# Patient Record
Sex: MALE | Race: BLACK OR AFRICAN AMERICAN | Employment: OTHER | URBAN - METROPOLITAN AREA
[De-identification: names, ages, dates, MRNs, and addresses within clinical notes are randomized per-mention and may not be internally consistent; named-entity substitution may affect disease eponyms.]

---

## 2021-07-25 ENCOUNTER — HOSPITAL ENCOUNTER (EMERGENCY)
Age: 61
Discharge: HOME | End: 2021-07-25
Attending: EMERGENCY MEDICINE
Payer: OTHER GOVERNMENT

## 2021-07-25 ENCOUNTER — APPOINTMENT (OUTPATIENT)
Dept: GENERAL RADIOLOGY | Age: 61
End: 2021-07-25
Payer: OTHER GOVERNMENT

## 2021-07-25 VITALS
SYSTOLIC BLOOD PRESSURE: 107 MMHG | HEIGHT: 71 IN | BODY MASS INDEX: 27.3 KG/M2 | RESPIRATION RATE: 16 BRPM | DIASTOLIC BLOOD PRESSURE: 73 MMHG | HEART RATE: 87 BPM | OXYGEN SATURATION: 96 % | TEMPERATURE: 98.9 F | WEIGHT: 195 LBS

## 2021-07-25 DIAGNOSIS — R11.0 NAUSEA WITHOUT VOMITING: ICD-10-CM

## 2021-07-25 DIAGNOSIS — M79.10 MYALGIA: ICD-10-CM

## 2021-07-25 DIAGNOSIS — R05.9 COUGH: ICD-10-CM

## 2021-07-25 DIAGNOSIS — R19.7 DIARRHEA, UNSPECIFIED TYPE: ICD-10-CM

## 2021-07-25 DIAGNOSIS — T50.Z95A ADVERSE EFFECT OF VACCINE, INITIAL ENCOUNTER: Primary | ICD-10-CM

## 2021-07-25 PROCEDURE — 71045 X-RAY EXAM CHEST 1 VIEW: CPT

## 2021-07-25 PROCEDURE — 99284 EMERGENCY DEPT VISIT MOD MDM: CPT

## 2021-07-25 PROCEDURE — 6370000000 HC RX 637 (ALT 250 FOR IP): Performed by: EMERGENCY MEDICINE

## 2021-07-25 RX ORDER — BENZONATATE 100 MG/1
100 CAPSULE ORAL 2 TIMES DAILY PRN
Qty: 14 CAPSULE | Refills: 0 | Status: SHIPPED | OUTPATIENT
Start: 2021-07-25 | End: 2021-08-01

## 2021-07-25 RX ORDER — LOPERAMIDE HYDROCHLORIDE 2 MG/1
2 CAPSULE ORAL 4 TIMES DAILY PRN
Qty: 20 CAPSULE | Refills: 0 | Status: SHIPPED | OUTPATIENT
Start: 2021-07-25 | End: 2021-08-04

## 2021-07-25 RX ORDER — METHOCARBAMOL 500 MG/1
500 TABLET, FILM COATED ORAL 3 TIMES DAILY PRN
Qty: 21 TABLET | Refills: 0 | Status: SHIPPED | OUTPATIENT
Start: 2021-07-25 | End: 2021-08-01

## 2021-07-25 RX ORDER — LOPERAMIDE HYDROCHLORIDE 2 MG/1
2 CAPSULE ORAL ONCE
Status: COMPLETED | OUTPATIENT
Start: 2021-07-25 | End: 2021-07-25

## 2021-07-25 RX ORDER — ONDANSETRON 4 MG/1
4 TABLET, FILM COATED ORAL 3 TIMES DAILY PRN
Qty: 15 TABLET | Refills: 0 | Status: SHIPPED | OUTPATIENT
Start: 2021-07-25

## 2021-07-25 RX ORDER — METHOCARBAMOL 500 MG/1
1000 TABLET, FILM COATED ORAL ONCE
Status: COMPLETED | OUTPATIENT
Start: 2021-07-25 | End: 2021-07-25

## 2021-07-25 RX ADMIN — LOPERAMIDE HYDROCHLORIDE 2 MG: 2 CAPSULE ORAL at 21:10

## 2021-07-25 RX ADMIN — METHOCARBAMOL 1000 MG: 500 TABLET ORAL at 21:10

## 2021-07-25 NOTE — ED TRIAGE NOTES
Patient presents to the ER with c/o of nausea, cough, and generalized illness. Patient states he received his 2nd COVID shot on the 21st and has not felt good since then.

## 2021-07-26 NOTE — ED PROVIDER NOTES
CHIEF COMPLAINT    Chief Complaint   Patient presents with    Nausea     states got his 2nd COVID shot 4 days ago; having side effects    Cough     HPI  Keena Bueno is a 63 y.o. male who presents to the ED with complaints of myalgias, cough, diarrhea and nausea over the last 4 days. Patient states that he received his second COVID-19 vaccination 4 days ago and since that time has been feeling generally unwell. He has been experiencing fatigue with diffuse body myalgias and cough. The cough is intermittently productive of clear sputum. In addition he has experienced some nausea and today began to experience persistent diarrhea. Nothing seems to make symptoms better or worse. Patient states he symptoms take DayQuil once yesterday without relief. He did take Motrin approximately 2 hours prior to arrival as well. Denies measured fevers, chest pain, shortness of breath, dizziness, lightheadedness. REVIEW OF SYSTEMS  Constitutional: Complains of chills without measured fevers  Eye: No visual changes  HENT: No earache or sore throat. Resp: Complains of cough. No shortness of breath  Cardio: No chest pain or palpitations. GI: No abdominal pain, nausea, vomiting, constipation or diarrhea. No melena. : No dysuria, urgency or frequency. Endocrine: No heat intolerance, no cold intolerance, no polydipsia   Lymphatics: No adenopathy  Musculoskeletal: Complains of myalgias  Neuro: No headaches. Psych: No homicidal or suicidal thoughts  Skin: No rash, No itching. ?  ? PAST MEDICAL HISTORY  History reviewed. No pertinent past medical history. FAMILY HISTORY  History reviewed. No pertinent family history.   SOCIAL HISTORY  Social History     Socioeconomic History    Marital status: Single     Spouse name: None    Number of children: None    Years of education: None    Highest education level: None   Occupational History    None   Tobacco Use    Smoking status: Never Smoker   Substance and Sexual Activity    Alcohol use: Never    Drug use: Never    Sexual activity: None   Other Topics Concern    None   Social History Narrative    None     Social Determinants of Health     Financial Resource Strain:     Difficulty of Paying Living Expenses:    Food Insecurity:     Worried About Running Out of Food in the Last Year:     [address] in the Last Year:    Transportation Needs:     Lack of Transportation (Medical):  Lack of Transportation (Non-Medical):    Physical Activity:     Days of Exercise per Week:     Minutes of Exercise per Session:    Stress:     Feeling of Stress :    Social Connections:     Frequency of Communication with Friends and Family:     Frequency of Social Gatherings with Friends and Family:     Attends [employer] Services:     Active Member of Clubs or Organizations:     Attends Club or Organization Meetings:     Marital Status:    Intimate Partner Violence:     Fear of Current or Ex-Partner:     Emotionally Abused:     Physically Abused:     Sexually Abused:        SURGICAL HISTORY  History reviewed. No pertinent surgical history. CURRENT MEDICATIONS  Discharge Medication List as of 7/25/2021  9:35 PM        ALLERGIES  No Known Allergies    Nursing notes reviewed by myself for past medical history, family history, social history, surgical history, current medications, and allergies. PHYSICAL EXAM  VITAL SIGNS: Triage VS:    ED Triage Vitals [07/25/21 1943]   Enc Vitals Group      /81      Pulse 105      Resp       Temp 99 °F (37.2 °C)      Temp Source Oral      SpO2 95 %      Weight 195 lb (88.5 kg)      Height 5' 11\" (1.803 m)      Head Circumference       Peak Flow       Pain Score       Pain Loc       Pain Edu? Excl. in [address]?       Constitutional: Well developed, Well nourished, nontoxic appearing  HENT: Normocephalic, Atraumatic, Bilateral external ears normal, Oropharynx moist, No oral exudates, Nose normal.   Eyes: PERRL, EOMI, Conjunctiva normal, No discharge. No scleral icterus. Neck: Normal range of motion, No tenderness, Supple. Lymphatic: No lymphadenopathy noted. Cardiovascular: Normal heart rate, Normal rhythm, No murmurs, gallops or rubs. Thorax & Lungs: Normal breath sounds, No respiratory distress, No wheezing. Abdomen: Soft, No tenderness, No masses, No pulsatile masses, No distention, Normal bowel sounds. Skin: Warm, Dry, Pink, No mottling, No erythema, No rash. Back: No tenderness, No CVA tenderness. Extremities: No edema, No tenderness, No cyanosis, Normal perfusion, No clubbing. Musculoskeletal: Good range of motion in all major joints as observed. No major deformities noted. Neurologic: Alert & oriented x 3, Normal motor function, Normal sensory function, CN II-XII grossly intact as tested, No focal deficits noted. Psychiatric: Affect normal, Judgment normal, Mood normal.     RADIOLOGY  Labs Reviewed - No data to display  I personally reviewed the images. The radiologist's interpretation reveals:  Last Imaging results   XR CHEST PORTABLE   Final Result   No acute process. MEDS GIVEN IN ED:  Medications   loperamide (IMODIUM) capsule 2 mg (2 mg Oral Given 7/25/21 2110)   methocarbamol (ROBAXIN) tablet 1,000 mg (1,000 mg Oral Given 7/25/21 2110)     COURSE & MEDICAL DECISION MAKING  72-year-old male presents emergency department complaints of cough, myalgias, nausea, and diarrhea over the last 4 days after he received his second COVID-19 vaccination. On exam he is nontoxic-appearing. Lungs clear to auscultation bilaterally. Abdomen soft and nontender. At this time will obtain chest x-ray and treat the patient symptomatically with loperamide and Robaxin. Chest x-ray is without acute cardiopulmonary process. Patient had improvement of symptoms with aforementioned inventions. At this time I feel patient is appropriate discharge home and is likely experiencing vaccine reaction.   Provided with a prescription

## 2021-07-26 NOTE — ED NOTES
Pt. reviewed discharge instructions, follow up instructions, and new medications. Pt.  given printed prescriptions. Pt. verbalizes understanding with no further questions. Pt. ambulatory and not showing any signs of distress.        Bart Olea RN  07/25/21 2316